# Patient Record
Sex: FEMALE | Race: BLACK OR AFRICAN AMERICAN
[De-identification: names, ages, dates, MRNs, and addresses within clinical notes are randomized per-mention and may not be internally consistent; named-entity substitution may affect disease eponyms.]

---

## 2020-05-21 ENCOUNTER — HOSPITAL ENCOUNTER (INPATIENT)
Dept: HOSPITAL 35 - ER | Age: 65
LOS: 3 days | DRG: 871 | End: 2020-05-24
Attending: HOSPITALIST | Admitting: HOSPITALIST
Payer: COMMERCIAL

## 2020-05-21 VITALS — SYSTOLIC BLOOD PRESSURE: 123 MMHG | DIASTOLIC BLOOD PRESSURE: 77 MMHG

## 2020-05-21 VITALS — DIASTOLIC BLOOD PRESSURE: 77 MMHG | SYSTOLIC BLOOD PRESSURE: 122 MMHG

## 2020-05-21 VITALS — WEIGHT: 160 LBS | BODY MASS INDEX: 25.11 KG/M2 | HEIGHT: 67.01 IN

## 2020-05-21 VITALS — SYSTOLIC BLOOD PRESSURE: 124 MMHG | DIASTOLIC BLOOD PRESSURE: 82 MMHG

## 2020-05-21 VITALS — DIASTOLIC BLOOD PRESSURE: 80 MMHG | SYSTOLIC BLOOD PRESSURE: 126 MMHG

## 2020-05-21 DIAGNOSIS — M10.9: ICD-10-CM
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
## 2020-05-21 DIAGNOSIS — Z98.84: ICD-10-CM

## 2020-05-21 DIAGNOSIS — R64: ICD-10-CM

## 2020-05-21 DIAGNOSIS — S20.212A: ICD-10-CM

## 2020-05-21 DIAGNOSIS — D64.9: ICD-10-CM

## 2020-05-21 DIAGNOSIS — N18.9: ICD-10-CM

## 2020-05-21 DIAGNOSIS — Z87.891: ICD-10-CM

## 2020-05-21 DIAGNOSIS — R65.21: ICD-10-CM

## 2020-05-21 DIAGNOSIS — Z90.49: ICD-10-CM
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
## 2020-05-21 DIAGNOSIS — N17.9: ICD-10-CM

## 2020-05-21 DIAGNOSIS — I95.9: ICD-10-CM

## 2020-05-21 DIAGNOSIS — R00.0: ICD-10-CM

## 2020-05-21 DIAGNOSIS — A41.9: Primary | ICD-10-CM

## 2020-05-21 DIAGNOSIS — G92: ICD-10-CM

## 2020-05-21 DIAGNOSIS — Z20.828: ICD-10-CM

## 2020-05-21 DIAGNOSIS — E11.22: ICD-10-CM

## 2020-05-21 DIAGNOSIS — J96.01: ICD-10-CM

## 2020-05-21 DIAGNOSIS — I12.9: ICD-10-CM

## 2020-05-21 DIAGNOSIS — E03.9: ICD-10-CM
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
## 2020-05-21 DIAGNOSIS — D35.02: ICD-10-CM

## 2020-05-21 DIAGNOSIS — Z90.710: ICD-10-CM

## 2020-05-21 DIAGNOSIS — E43: ICD-10-CM

## 2020-05-21 DIAGNOSIS — E83.51: ICD-10-CM

## 2020-05-21 DIAGNOSIS — E78.5: ICD-10-CM

## 2020-05-21 DIAGNOSIS — Z79.899: ICD-10-CM
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
## 2020-05-21 DIAGNOSIS — R63.0: ICD-10-CM

## 2020-05-21 LAB
ALBUMIN SERPL-MCNC: 1.4 G/DL (ref 3.4–5)
ALT SERPL-CCNC: 51 U/L (ref 30–65)
ANION GAP SERPL CALC-SCNC: 11 MMOL/L (ref 7–16)
ANISOCYTOSIS BLD QL SMEAR: (no result)
AST SERPL-CCNC: 68 U/L (ref 15–37)
BASOPHILS NFR BLD AUTO: 0.3 % (ref 0–2)
BILIRUB SERPL-MCNC: 1 MG/DL
BILIRUB UR-MCNC: NEGATIVE MG/DL
BUN SERPL-MCNC: 24 MG/DL (ref 7–18)
CALCIUM SERPL-MCNC: 7.2 MG/DL (ref 8.5–10.1)
CHLORIDE SERPL-SCNC: 108 MMOL/L (ref 98–107)
CHOLEST SERPL-MCNC: 102 MG/DL (ref ?–200)
CO2 SERPL-SCNC: 22 MMOL/L (ref 21–32)
COLOR UR: YELLOW
CREAT SERPL-MCNC: 1.9 MG/DL (ref 0.6–1)
EOSINOPHIL NFR BLD: 0 % (ref 0–3)
ERYTHROCYTE [DISTWIDTH] IN BLOOD BY AUTOMATED COUNT: 16.9 % (ref 10.5–14.5)
FOLATE SERPL-MCNC: 3 NG/ML (ref 8.6–58.9)
GLUCOSE SERPL-MCNC: 69 MG/DL (ref 74–106)
GRANULOCYTES NFR BLD MANUAL: 72.8 % (ref 36–66)
HCT VFR BLD CALC: 22.9 % (ref 37–47)
HDLC SERPL-MCNC: 6 MG/DL (ref 40–?)
HGB BLD-MCNC: 7.7 GM/DL (ref 12–15)
KETONES UR STRIP-MCNC: (no result) MG/DL
LDLC SERPL-MCNC: 63 MG/DL (ref ?–100)
LIPASE: 42 U/L (ref 73–393)
LYMPHOCYTES NFR BLD AUTO: 17.5 % (ref 24–44)
MACROCYTES: (no result)
MCH RBC QN AUTO: 36.4 PG (ref 26–34)
MCHC RBC AUTO-ENTMCNC: 33.8 G/DL (ref 28–37)
MCV RBC: 107.7 FL (ref 80–100)
MONOCYTES NFR BLD: 9.4 % (ref 1–8)
NEUTROPHILS # BLD: 2.4 THOU/UL (ref 1.4–8.2)
PLATELET # BLD EST: NORMAL 10*3/UL
PLATELET # BLD: 195 THOU/UL (ref 150–400)
POTASSIUM SERPL-SCNC: 3.4 MMOL/L (ref 3.5–5.1)
PROT SERPL-MCNC: 5.5 G/DL (ref 6.4–8.2)
RBC # BLD AUTO: 2.12 MIL/UL (ref 4.2–5)
RBC # UR STRIP: NEGATIVE /UL
SODIUM SERPL-SCNC: 141 MMOL/L (ref 136–145)
SP GR UR STRIP: 1.01 (ref 1–1.03)
TC:HDL: 17 RATIO
TRIGL SERPL-MCNC: 169 MG/DL (ref ?–150)
TSH SERPL-ACNC: 9.13 UIU/ML (ref 0.36–3.74)
URINE CLARITY: CLEAR
URINE GLUCOSE-RANDOM*: NEGATIVE
URINE LEUKOCYTES-REFLEX: NEGATIVE
URINE NITRITE-REFLEX: NEGATIVE
URINE PROTEIN (DIPSTICK): (no result)
UROBILINOGEN UR STRIP-ACNC: 4 E.U./DL (ref 0.2–1)
VLDLC SERPL CALC-MCNC: 34 MG/DL (ref ?–40)
WBC # BLD AUTO: 3.3 THOU/UL (ref 4–11)

## 2020-05-21 PROCEDURE — 10045: CPT

## 2020-05-21 PROCEDURE — 10040 EXTRACTION: CPT

## 2020-05-21 PROCEDURE — 10078: CPT

## 2020-05-21 PROCEDURE — 27000 TENOTOMY ADDUCTOR HIP PERQ: CPT

## 2020-05-21 PROCEDURE — B54MZZA ULTRASONOGRAPHY OF RIGHT UPPER EXTREMITY VEINS, GUIDANCE: ICD-10-PCS

## 2020-05-21 PROCEDURE — 05H933Z INSERTION OF INFUSION DEVICE INTO RIGHT BRACHIAL VEIN, PERCUTANEOUS APPROACH: ICD-10-PCS | Performed by: HOSPITALIST

## 2020-05-21 NOTE — EKG
[facility]
David [address]   [address]                     ELECTROCARDIOGRAM REPORT      
_______________________________________________________________________________
 
Name:       DONELLLISA                  Room #:         249-P       ADM IN  
M.R.#:      [id number]                       Account #:      [id number]  
Admission:  20    Attend Phys:    Arun Marie MD    
Discharge:              Date of Birth:  [birth year]  
                                                          Report #: [id number]
                                                                    [id number]
_______________________________________________________________________________
THIS REPORT FOR:  
 
cc:  FAM - Family physician unknown
     FAM - Family physician unknown
     Jemal Joaquin MD                                             ~
THIS REPORT FOR:   //name//                          
 
                          [facility]
                                       
Test Date:    2020               Test Time:    17:28:28
Pat Name:     LISA MARLEY             Department:   
Patient ID:   [id number]            Room:         [facility]
Gender:       F                        Technician:   RUTHY
:          1955               Requested By: Miryam Faith
Order Number: [id number] MD:     
                                 Measurements
Intervals                              Axis          
Rate:         118                      P:            56
KY:           108                      QRS:          -28
QRSD:         80                       T:            138
QT:           325                                    
QTc:          456                                    
                           Interpretive Statements
Sinus tachycardia
Borderline left axis deviation
Low voltage, extremity leads
Nonspecific T abnormalities, lateral leads
No previous ECG available for comparison
[email or web address]
 
 
 
 
 
 
 
 
 
 
 
 
 
 
 
                         
   By:                               
                   
D: 20 1728                           _____________________________________
T: 20 1728                           Epiphany Epiphany, MD           /EPI

## 2020-05-21 NOTE — NUR
CALLED TO ER THIS AM FOR PIV INSERTION, LASTED ONLY 1 HOUR. NUMEROUS ATTEMPTS
FOR PIV, SPOKE TO DR SHERIFF, PICC ORDERED. DISCUSSED BENEFITS AND RISKS
WITH PT, VERBALIZED UNDERSTANDING. TOSHIA BRACHIAL WIDELY PATENT WITH USG, UNABLE
TO PASS DL POWER PICC PAST SHOULDER AREA WITH MULTIPLE ATTEMPTS. DL PICC
TRIMMED TO BE 10CM MIDLINE AND LABELED AS MIDLINE. DR LOPEZ AND DR SHERIFF
NOTIFIED OF VASCULAR DIFFICULTIES WITH PT. PT TOLERATED WELL. ML RELEASED FOR
IMMEDIATE USE PER PROTOCOL TO KAN PEREZ.

## 2020-05-21 NOTE — NUR
iv team called to check midline, drg bloody, stat lock and drg chg'd. no
active bleeding noted. 4W RN informed that it is not picc line, but trimmed to
be midline

## 2020-05-22 VITALS — SYSTOLIC BLOOD PRESSURE: 133 MMHG | DIASTOLIC BLOOD PRESSURE: 95 MMHG

## 2020-05-22 VITALS — DIASTOLIC BLOOD PRESSURE: 85 MMHG | SYSTOLIC BLOOD PRESSURE: 126 MMHG

## 2020-05-22 VITALS — DIASTOLIC BLOOD PRESSURE: 90 MMHG | SYSTOLIC BLOOD PRESSURE: 128 MMHG

## 2020-05-22 VITALS — SYSTOLIC BLOOD PRESSURE: 102 MMHG | DIASTOLIC BLOOD PRESSURE: 73 MMHG

## 2020-05-22 VITALS — SYSTOLIC BLOOD PRESSURE: 131 MMHG | DIASTOLIC BLOOD PRESSURE: 92 MMHG

## 2020-05-22 VITALS — DIASTOLIC BLOOD PRESSURE: 78 MMHG | SYSTOLIC BLOOD PRESSURE: 113 MMHG

## 2020-05-22 VITALS — DIASTOLIC BLOOD PRESSURE: 94 MMHG | SYSTOLIC BLOOD PRESSURE: 134 MMHG

## 2020-05-22 LAB
ANION GAP SERPL CALC-SCNC: 10 MMOL/L (ref 7–16)
BUN SERPL-MCNC: 23 MG/DL (ref 7–18)
CALCIUM SERPL-MCNC: 7.1 MG/DL (ref 8.5–10.1)
CHLORIDE SERPL-SCNC: 108 MMOL/L (ref 98–107)
CO2 SERPL-SCNC: 22 MMOL/L (ref 21–32)
CREAT SERPL-MCNC: 1.9 MG/DL (ref 0.6–1)
ERYTHROCYTE [DISTWIDTH] IN BLOOD BY AUTOMATED COUNT: 20.3 % (ref 10.5–14.5)
GLUCOSE SERPL-MCNC: 79 MG/DL (ref 74–106)
HCT VFR BLD CALC: 28.8 % (ref 37–47)
HGB BLD-MCNC: 9.7 GM/DL (ref 12–15)
INR PPP: 1.3
IRON SERPL-MCNC: 49 UG/DL (ref 50–170)
MCH RBC QN AUTO: 34.9 PG (ref 26–34)
MCHC RBC AUTO-ENTMCNC: 33.6 G/DL (ref 28–37)
MCV RBC: 103.7 FL (ref 80–100)
PLATELET # BLD: 183 THOU/UL (ref 150–400)
POTASSIUM SERPL-SCNC: 3.4 MMOL/L (ref 3.5–5.1)
PROTHROMBIN TIME: 13.6 SECONDS (ref 9.3–11.4)
RBC # BLD AUTO: 2.78 MIL/UL (ref 4.2–5)
SAO2 % BLD FROM PO2: 120 % (ref 20–39)
SODIUM SERPL-SCNC: 140 MMOL/L (ref 136–145)
TIBC SERPL-MCNC: 41 UG/DL (ref 250–450)
WBC # BLD AUTO: 4.6 THOU/UL (ref 4–11)

## 2020-05-22 PROCEDURE — 30233N1 TRANSFUSION OF NONAUTOLOGOUS RED BLOOD CELLS INTO PERIPHERAL VEIN, PERCUTANEOUS APPROACH: ICD-10-PCS | Performed by: HOSPITALIST

## 2020-05-22 NOTE — NUR
PT ADMITTED RELATED TO BLOOD IN URINE, ABD PAIN. CM REVIEWED CHART AND SPOKE
WITH  CARE TEAM. PT INDICATD SHE HAD BEEN LIVING IN AN APARTMENT ALONE AND
USED A CANE AND A 4WW TO ASSIST WTIH MOBILITY. PT HAD BEEN STAYING WITH DTR
MALIKA AT [address] [facility]. SHE HAD BEEN ON SERVICE WITH [facility] PTA AND WANTS TO RESUME SERVICES UPON DC. IF READY TO DC
HOME OVER WEEKEND CALL [phone] FAX ORDERS TO [phone].

## 2020-05-22 NOTE — NUR
PT A&O, VSS, PAIN IN ABDOMEN. PATIENT HAD BLOOD TRANSFUSION TODAY. MIDLINE IN
RIGHT UPPER ARM. PATIENT HASNT URINATED AS OF YET, 193 ON BLADDER FROM SCAN.
NO SIGNS OF DISTRESS. WILL CONTINUE TO MONITOR.

## 2020-05-22 NOTE — NUR
PT WAS AN ADMIT FROM ER. PT IS ALERT AND ORIENTED. NO SIGN OF DISTRESS NOTED.
PT VERBALIZES ABDOMINAL PAIN. ADMISSION ASSESSMENT AND EDUCATION COMPLETED.
MED REC COMPLETED. SPOKE WITH PATIENT'S DAUGHTHER. DAUGHTER VERBALIZES
CONCERNS ON PATIENT SUCH AS RAPID WEIGHT LOSS FOR THE LAST COUPLE OF MONTHS.
BLOOD TRANSFUSION PENDING. FALL PRECAUTION IN PLACE. NO SIGN OF SIGN OF
DISTRESS NOTED. CONSENTS SIGNS SIGNED. CONTINUE TO MONITOR PT. DENIES ANY
FURTHER NEEDS AT THIS TIME.

## 2020-05-23 VITALS — SYSTOLIC BLOOD PRESSURE: 93 MMHG | DIASTOLIC BLOOD PRESSURE: 67 MMHG

## 2020-05-23 VITALS — SYSTOLIC BLOOD PRESSURE: 85 MMHG | DIASTOLIC BLOOD PRESSURE: 61 MMHG

## 2020-05-23 VITALS — DIASTOLIC BLOOD PRESSURE: 56 MMHG | SYSTOLIC BLOOD PRESSURE: 100 MMHG

## 2020-05-23 VITALS — DIASTOLIC BLOOD PRESSURE: 64 MMHG | SYSTOLIC BLOOD PRESSURE: 91 MMHG

## 2020-05-23 VITALS — SYSTOLIC BLOOD PRESSURE: 114 MMHG | DIASTOLIC BLOOD PRESSURE: 53 MMHG

## 2020-05-23 VITALS — DIASTOLIC BLOOD PRESSURE: 49 MMHG | SYSTOLIC BLOOD PRESSURE: 70 MMHG

## 2020-05-23 VITALS — DIASTOLIC BLOOD PRESSURE: 73 MMHG | SYSTOLIC BLOOD PRESSURE: 106 MMHG

## 2020-05-23 VITALS — SYSTOLIC BLOOD PRESSURE: 100 MMHG | DIASTOLIC BLOOD PRESSURE: 61 MMHG

## 2020-05-23 VITALS — SYSTOLIC BLOOD PRESSURE: 93 MMHG | DIASTOLIC BLOOD PRESSURE: 55 MMHG

## 2020-05-23 VITALS — DIASTOLIC BLOOD PRESSURE: 93 MMHG | SYSTOLIC BLOOD PRESSURE: 116 MMHG

## 2020-05-23 VITALS — DIASTOLIC BLOOD PRESSURE: 34 MMHG | SYSTOLIC BLOOD PRESSURE: 56 MMHG

## 2020-05-23 VITALS — DIASTOLIC BLOOD PRESSURE: 82 MMHG | SYSTOLIC BLOOD PRESSURE: 111 MMHG

## 2020-05-23 VITALS — SYSTOLIC BLOOD PRESSURE: 90 MMHG | DIASTOLIC BLOOD PRESSURE: 56 MMHG

## 2020-05-23 VITALS — DIASTOLIC BLOOD PRESSURE: 94 MMHG | SYSTOLIC BLOOD PRESSURE: 138 MMHG

## 2020-05-23 VITALS — SYSTOLIC BLOOD PRESSURE: 94 MMHG | DIASTOLIC BLOOD PRESSURE: 64 MMHG

## 2020-05-23 VITALS — SYSTOLIC BLOOD PRESSURE: 116 MMHG | DIASTOLIC BLOOD PRESSURE: 73 MMHG

## 2020-05-23 VITALS — SYSTOLIC BLOOD PRESSURE: 132 MMHG | DIASTOLIC BLOOD PRESSURE: 104 MMHG

## 2020-05-23 VITALS — DIASTOLIC BLOOD PRESSURE: 126 MMHG | SYSTOLIC BLOOD PRESSURE: 148 MMHG

## 2020-05-23 VITALS — SYSTOLIC BLOOD PRESSURE: 85 MMHG | DIASTOLIC BLOOD PRESSURE: 56 MMHG

## 2020-05-23 VITALS — DIASTOLIC BLOOD PRESSURE: 37 MMHG | SYSTOLIC BLOOD PRESSURE: 83 MMHG

## 2020-05-23 VITALS — SYSTOLIC BLOOD PRESSURE: 93 MMHG | DIASTOLIC BLOOD PRESSURE: 68 MMHG

## 2020-05-23 VITALS — SYSTOLIC BLOOD PRESSURE: 117 MMHG | DIASTOLIC BLOOD PRESSURE: 61 MMHG

## 2020-05-23 VITALS — DIASTOLIC BLOOD PRESSURE: 50 MMHG | SYSTOLIC BLOOD PRESSURE: 76 MMHG

## 2020-05-23 VITALS — SYSTOLIC BLOOD PRESSURE: 102 MMHG | DIASTOLIC BLOOD PRESSURE: 63 MMHG

## 2020-05-23 LAB
ALBUMIN SERPL-MCNC: 1.2 G/DL (ref 3.4–5)
ALT SERPL-CCNC: 103 U/L (ref 30–65)
ANION GAP SERPL CALC-SCNC: 14 MMOL/L (ref 7–16)
ANISOCYTOSIS BLD QL SMEAR: (no result)
AST SERPL-CCNC: 227 U/L (ref 15–37)
BE(VIVO): -10.8 MMOL/L
BILIRUB SERPL-MCNC: 1.3 MG/DL
BUN SERPL-MCNC: 23 MG/DL (ref 7–18)
CALCIUM SERPL-MCNC: 6.8 MG/DL (ref 8.5–10.1)
CHLORIDE SERPL-SCNC: 112 MMOL/L (ref 98–107)
CO2 SERPL-SCNC: 16 MMOL/L (ref 21–32)
CREAT SERPL-MCNC: 2.4 MG/DL (ref 0.6–1)
ERYTHROCYTE [DISTWIDTH] IN BLOOD BY AUTOMATED COUNT: 21.1 % (ref 10.5–14.5)
ERYTHROCYTE [DISTWIDTH] IN BLOOD BY AUTOMATED COUNT: 21.4 % (ref 10.5–14.5)
EST. AVERAGE GLUCOSE BLD GHB EST-MCNC: < 74 MG/DL
GLUCOSE SERPL-MCNC: 253 MG/DL (ref 74–106)
GLYCOHEMOGLOBIN (HGB A1C): < 4.2 % (ref 4.8–5.6)
GRANULOCYTES NFR BLD MANUAL: 87 % (ref 36–66)
HCO3 BLD-SCNC: 11.5 MMOL/L (ref 22–26)
HCT VFR BLD CALC: 27.3 % (ref 37–47)
HCT VFR BLD CALC: 28.9 % (ref 37–47)
HGB BLD-MCNC: 9.1 GM/DL (ref 12–15)
HGB BLD-MCNC: 9.6 GM/DL (ref 12–15)
LYMPHOCYTES NFR BLD AUTO: 8 % (ref 24–44)
MACROCYTES: (no result)
MAGNESIUM SERPL-MCNC: 1.3 MG/DL (ref 1.8–2.4)
MCH RBC QN AUTO: 34.8 PG (ref 26–34)
MCH RBC QN AUTO: 35.4 PG (ref 26–34)
MCHC RBC AUTO-ENTMCNC: 33.2 G/DL (ref 28–37)
MCHC RBC AUTO-ENTMCNC: 33.3 G/DL (ref 28–37)
MCV RBC: 104.6 FL (ref 80–100)
MCV RBC: 106.3 FL (ref 80–100)
MONOCYTES NFR BLD: 1 % (ref 1–8)
NEUTROPHILS # BLD: 6.6 THOU/UL (ref 1.4–8.2)
NEUTS BAND NFR BLD: 4 % (ref 0–8)
PCO2 BLD: 17.7 MMHG (ref 35–45)
PLATELET # BLD: 157 THOU/UL (ref 150–400)
PLATELET # BLD: 164 THOU/UL (ref 150–400)
PO2 BLD: 253.5 MMHG (ref 80–100)
POLYCHROMASIA BLD QL SMEAR: (no result)
POTASSIUM SERPL-SCNC: 3.8 MMOL/L (ref 3.5–5.1)
PROT SERPL-MCNC: 5.2 G/DL (ref 6.4–8.2)
RBC # BLD AUTO: 2.57 MIL/UL (ref 4.2–5)
RBC # BLD AUTO: 2.76 MIL/UL (ref 4.2–5)
SODIUM SERPL-SCNC: 142 MMOL/L (ref 136–145)
TROPONIN I SERPL-MCNC: 1.57 NG/ML (ref ?–0.06)
WBC # BLD AUTO: 6.2 THOU/UL (ref 4–11)
WBC # BLD AUTO: 7.3 THOU/UL (ref 4–11)

## 2020-05-23 PROCEDURE — 5A09357 ASSISTANCE WITH RESPIRATORY VENTILATION, LESS THAN 24 CONSECUTIVE HOURS, CONTINUOUS POSITIVE AIRWAY PRESSURE: ICD-10-PCS | Performed by: INTERNAL MEDICINE

## 2020-05-23 PROCEDURE — 02H633Z INSERTION OF INFUSION DEVICE INTO RIGHT ATRIUM, PERCUTANEOUS APPROACH: ICD-10-PCS | Performed by: HOSPITALIST

## 2020-05-23 NOTE — NUR
PT A&OX4, VSS, PAIN IN ABDOMEN. PATIENT HAS NAUSEA NO VOMITING. IV FLUIDS
DISCONTINUED. MIDLINE PATENT RIGHT UPPER ARM. NO SIGNS OF DISTRESS. WILL
CONTINUE TO MOITOR.

## 2020-05-23 NOTE — NUR
VAT CONSULTED FOR MORE ACCESS FOR THIS PT .  RT ML LEAKING AND REMOVED.
ML WAS PLACED AS A PICC WOULD NOT THREAD AND THIS PT HAS CKD AND  NO VESSELS
VIABLE IN LT ARM.  ATTEMPTED RT AND LT IJ PLACEMENT BUT PT COMBATIVE AND
UNCOOPERATIVE.  PLACED 3 PIV'S.  PT THEN HAD A RAPID RESPONSE AND SENT ,
PLACED ANOTHER 2OG IN LT EJ SO RT IJ COULD BE ATTEMPTED WITH PT IN RESTRAINTS
AND NOT COMBATIVE.  SUCCESSFUL TLCL PLACED RT IJ AND CXR REVEALED THE TIP AT
THE CAJ AND RELEASED TO RN FOR USE.

## 2020-05-23 NOTE — NUR
Rapid response called on patient for AMS and hypotension. Upon arrival to
unit, Dr. massey had already placed orders to transfer patient to ICU. Chest
xray completed in room. pt transferred to room 249

## 2020-05-23 NOTE — NUR
PT TRANSFERRED TO ICU D/T HYPOTENSION, HYPOGLYCEMIA, AMS. UNABLE TO GET LABS
DRAWN ON PATIENT AND PICC PLACEMENT ON 4W UNSUCCESSFUL AS WELL. PATIENT SKIN
TONE PALE. PATIENT PLACED ON 6L O2. RAPID RESPONSE CALLED. DOCTOR AND
SUPERVISOR NOTIFIED.

## 2020-05-23 NOTE — NUR
PT IS A/0 X4.  FALL PRECAUTIONS IN PLACE, AS PT IS VERY WEAK AND HAS DIZZINESS
WHEN TRYING TO STAND.  PT FINALLY VOIDED URINE WITH ONLY 250ML.  URINE IS TEA
COLORED.  HR RUNS 100-120.  NO RATE CONTROL MEDICATIONS, SPOKE TO ONCOMING
NURSE AND TO FOLLOW UP.  HOURLY ROUNDING.

## 2020-05-24 VITALS — DIASTOLIC BLOOD PRESSURE: 94 MMHG | SYSTOLIC BLOOD PRESSURE: 112 MMHG

## 2020-05-24 VITALS — SYSTOLIC BLOOD PRESSURE: 111 MMHG | DIASTOLIC BLOOD PRESSURE: 81 MMHG

## 2020-05-24 VITALS — DIASTOLIC BLOOD PRESSURE: 74 MMHG | SYSTOLIC BLOOD PRESSURE: 110 MMHG

## 2020-05-24 VITALS — DIASTOLIC BLOOD PRESSURE: 61 MMHG | SYSTOLIC BLOOD PRESSURE: 91 MMHG

## 2020-05-24 VITALS — SYSTOLIC BLOOD PRESSURE: 88 MMHG | DIASTOLIC BLOOD PRESSURE: 67 MMHG

## 2020-05-24 VITALS — SYSTOLIC BLOOD PRESSURE: 99 MMHG | DIASTOLIC BLOOD PRESSURE: 75 MMHG

## 2020-05-24 VITALS — DIASTOLIC BLOOD PRESSURE: 13 MMHG | SYSTOLIC BLOOD PRESSURE: 68 MMHG

## 2020-05-24 VITALS — DIASTOLIC BLOOD PRESSURE: 62 MMHG | SYSTOLIC BLOOD PRESSURE: 90 MMHG

## 2020-05-24 VITALS — SYSTOLIC BLOOD PRESSURE: 108 MMHG | DIASTOLIC BLOOD PRESSURE: 68 MMHG

## 2020-05-24 VITALS — SYSTOLIC BLOOD PRESSURE: 89 MMHG | DIASTOLIC BLOOD PRESSURE: 56 MMHG

## 2020-05-24 VITALS — SYSTOLIC BLOOD PRESSURE: 103 MMHG | DIASTOLIC BLOOD PRESSURE: 58 MMHG

## 2020-05-24 VITALS — SYSTOLIC BLOOD PRESSURE: 100 MMHG | DIASTOLIC BLOOD PRESSURE: 60 MMHG

## 2020-05-24 VITALS — DIASTOLIC BLOOD PRESSURE: 68 MMHG | SYSTOLIC BLOOD PRESSURE: 91 MMHG

## 2020-05-24 VITALS — DIASTOLIC BLOOD PRESSURE: 71 MMHG | SYSTOLIC BLOOD PRESSURE: 107 MMHG

## 2020-05-24 VITALS — DIASTOLIC BLOOD PRESSURE: 72 MMHG | SYSTOLIC BLOOD PRESSURE: 114 MMHG

## 2020-05-24 VITALS — DIASTOLIC BLOOD PRESSURE: 71 MMHG | SYSTOLIC BLOOD PRESSURE: 105 MMHG

## 2020-05-24 VITALS — SYSTOLIC BLOOD PRESSURE: 107 MMHG | DIASTOLIC BLOOD PRESSURE: 88 MMHG

## 2020-05-24 VITALS — SYSTOLIC BLOOD PRESSURE: 73 MMHG | DIASTOLIC BLOOD PRESSURE: 54 MMHG

## 2020-05-24 VITALS — SYSTOLIC BLOOD PRESSURE: 124 MMHG | DIASTOLIC BLOOD PRESSURE: 79 MMHG

## 2020-05-24 LAB
ALBUMIN SERPL-MCNC: 1.2 G/DL (ref 3.4–5)
ALT SERPL-CCNC: 104 U/L (ref 30–65)
ANION GAP SERPL CALC-SCNC: 17 MMOL/L (ref 7–16)
ANISOCYTOSIS BLD QL SMEAR: (no result)
APTT BLD: 36.1 SECONDS (ref 24.5–32.8)
AST SERPL-CCNC: 213 U/L (ref 15–37)
BACTERIA #/AREA URNS HPF: (no result) /HPF
BASOPHILS NFR BLD AUTO: 0.3 % (ref 0–2)
BILIRUB SERPL-MCNC: 1.4 MG/DL
BILIRUB UR-MCNC: NEGATIVE MG/DL
BUN SERPL-MCNC: 23 MG/DL (ref 7–18)
CALCIUM SERPL-MCNC: 6.6 MG/DL (ref 8.5–10.1)
CHLORIDE SERPL-SCNC: 111 MMOL/L (ref 98–107)
CO2 SERPL-SCNC: 13 MMOL/L (ref 21–32)
COLOR UR: YELLOW
CREAT SERPL-MCNC: 2.5 MG/DL (ref 0.6–1)
EOSINOPHIL NFR BLD: 0.1 % (ref 0–3)
ERYTHROCYTE [DISTWIDTH] IN BLOOD BY AUTOMATED COUNT: 21.7 % (ref 10.5–14.5)
GLUCOSE SERPL-MCNC: 175 MG/DL (ref 74–106)
GRANULOCYTES NFR BLD MANUAL: 89.1 % (ref 36–66)
HCT VFR BLD CALC: 29.4 % (ref 37–47)
HGB BLD-MCNC: 9.5 GM/DL (ref 12–15)
HOWELL-JOLLY BOD BLD QL SMEAR: (no result)
INR PPP: 1.4
KETONES UR STRIP-MCNC: NEGATIVE MG/DL
LYMPHOCYTES NFR BLD AUTO: 6.5 % (ref 24–44)
MACROCYTES: (no result)
MAGNESIUM SERPL-MCNC: 2.4 MG/DL (ref 1.8–2.4)
MCH RBC QN AUTO: 34.8 PG (ref 26–34)
MCHC RBC AUTO-ENTMCNC: 32.4 G/DL (ref 28–37)
MCV RBC: 107.4 FL (ref 80–100)
MONOCYTES NFR BLD: 4 % (ref 1–8)
NEUTROPHILS # BLD: 8.7 THOU/UL (ref 1.4–8.2)
NITRITE UR QL STRIP: POSITIVE
PLATELET # BLD: 164 THOU/UL (ref 150–400)
POLYCHROMASIA BLD QL SMEAR: (no result)
POTASSIUM SERPL-SCNC: 3.7 MMOL/L (ref 3.5–5.1)
PROT SERPL-MCNC: 5.3 G/DL (ref 6.4–8.2)
PROTHROMBIN TIME: 14.7 SECONDS (ref 9.3–11.4)
RBC # BLD AUTO: 2.74 MIL/UL (ref 4.2–5)
RBC # UR STRIP: (no result) /UL
SODIUM SERPL-SCNC: 141 MMOL/L (ref 136–145)
SP GR UR STRIP: 1.02 (ref 1–1.03)
SQUAMOUS: (no result) /LPF (ref 0–3)
TROPONIN I SERPL-MCNC: 1.5 NG/ML (ref ?–0.06)
URINE CLARITY: CLEAR
URINE GLUCOSE-RANDOM*: NEGATIVE
URINE LEUKOCYTES: NEGATIVE
URINE PROTEIN (DIPSTICK): (no result)
UROBILINOGEN UR STRIP-ACNC: 4 E.U./DL (ref 0.2–1)
WBC # BLD AUTO: 9.7 THOU/UL (ref 4–11)

## 2020-05-24 PROCEDURE — 5A12012 PERFORMANCE OF CARDIAC OUTPUT, SINGLE, MANUAL: ICD-10-PCS

## 2020-05-24 PROCEDURE — 5A09357 ASSISTANCE WITH RESPIRATORY VENTILATION, LESS THAN 24 CONSECUTIVE HOURS, CONTINUOUS POSITIVE AIRWAY PRESSURE: ICD-10-PCS | Performed by: INTERNAL MEDICINE

## 2020-05-24 PROCEDURE — 0BH17EZ INSERTION OF ENDOTRACHEAL AIRWAY INTO TRACHEA, VIA NATURAL OR ARTIFICIAL OPENING: ICD-10-PCS | Performed by: HOSPITALIST

## 2020-05-24 NOTE — EKG
[facility]
David Madden
[place]   [address]                     ELECTROCARDIOGRAM REPORT      
_______________________________________________________________________________
 
Name:       LISA MARLEY                  Room #:         249-P       DIS IN  
M.R.#:      [id number]                       Account #:      [id number]  
Admission:  20    Attend Phys:    Arun Marie MD    
Discharge:  20    Date of Birth:  [birth year]  
                                                          Report #: [id number]
                                                                    [id number]
_______________________________________________________________________________
THIS REPORT FOR:  
 
cc:  FAM - Family physician unknown
     FAM - Family physician unknown
     Daniel Hopkins MD                                                   ~
THIS REPORT FOR:   //name//                          
 
                          [facility]
                                       
Test Date:    2020               Test Time:    12:04:40
Pat Name:     LISA MARLEY             Department:   
Patient ID:   [id number]            Room:         [facility]
Gender:       F                        Technician:   VIOLET
:          1955               Requested By: Miryam Faith
Order Number: [id number] MD:   Daniel Hopkins
                                 Measurements
Intervals                              Axis          
Rate:         117                      P:            48
LA:           141                      QRS:          -32
QRSD:         80                       T:            140
QT:           326                                    
QTc:          455                                    
                           Interpretive Statements
Sinus tachycardia
Probable left atrial enlargement
Left axis deviation
Borderline low voltage, extremity leads
Poor R wave progression
Abnormal T, consider ischemia, lateral leads
No previous ECG available for comparison
 
Electronically Signed On 2020 12:01:32 CDT by Daniel Hopkins
[email or web address]
 
 
 
 
 
 
 
 
 
 
 
  <ELECTRONICALLY SIGNED>
   By: Daniel Hopkins MD               
  20     1201
D: 20 1204                           _____________________________________
T: 204                           Daniel Hopkins MD                 /EPI

## 2020-05-24 NOTE — NUR
AT BEDSIDE. SUDDENLY HEART RATE DROPPING TO 30  EYES ROLLED BACK CODE BLUE
CALLED. SEE CODE BLUE SHEET FOR INFO.

## 2020-05-24 NOTE — NUR
O2  % ON BIPAP. FOLLOWS SIMPLE COMMANDS. REMAINS IN SINUS TACH.
URINE OUTPUT LOW. LEVOPHED GTT AT 10 MCG. BICARB GTT INFUSING. LUNGS SLIGHTLY
COARSE IN UPPER LOBES NOW.  WILL CONT TO MONITOR.

## 2020-05-24 NOTE — NUR
PT STARRING AT CEILING AND MUMBLING [place]. RESTLESS  TRYING TO
PICK AT LINES. REMAINS IN SINUS TACH

## 2020-05-24 NOTE — EKG
[facility]
David Madden
[place]   [address]                     ELECTROCARDIOGRAM REPORT      
_______________________________________________________________________________
 
Name:       LISA MARLEY                  Room #:         249-P       DIS IN  
M.R.#:      [id number]                       Account #:      [id number]  
Admission:  20    Attend Phys:    Arun Marie MD    
Discharge:  20    Date of Birth:  [birth year]  
                                                          Report #: [id number]
                                                                    [id number]
_______________________________________________________________________________
THIS REPORT FOR:  
 
cc:  FAM - Family physician unknown
     FAM - Family physician unknown
     Daniel Hopkins MD                                                   ~
THIS REPORT FOR:   //name//                          
 
                          [facility]
                                       
Test Date:    2020               Test Time:    17:28:28
Pat Name:     LISA MARLEY             Department:   
Patient ID:   [id number]            Room:         [facility]
Gender:       F                        Technician:   RUTHY
:          1955               Requested By: Miryam Faith
Order Number: [id number] MD:   Daniel Hopkins
                                 Measurements
Intervals                              Axis          
Rate:         118                      P:            56
ME:           108                      QRS:          -28
QRSD:         80                       T:            138
QT:           325                                    
QTc:          456                                    
                           Interpretive Statements
Sinus tachycardia
Borderline left axis deviation
Low voltage, extremity leads
Nonspecific T abnormalities, lateral leads
No previous ECG available for comparison
 
Electronically Signed On 2020 12:04:09 CDT by Daniel Hopkins
[email or web address]
 
 
 
 
 
 
 
 
 
 
 
 
 
  <ELECTRONICALLY SIGNED>
   By: Daniel Hopkins MD               
  20     1204
D: 20 1728                           _____________________________________
T: 20 1728                           Daniel Hopkins MD                 /[facility]

## 2020-05-24 NOTE — NUR
1045: PATIENT MEDICATION BAG PICKED UP FROM PHARMACY, SENT WITH BELONGINGS.
OTHER BELONGINGS INCLUDE HAIR NET AND CELL PHONE . BODY PICKED UP BY
 HOME AT 1050.